# Patient Record
Sex: FEMALE | Race: BLACK OR AFRICAN AMERICAN | NOT HISPANIC OR LATINO | Employment: FULL TIME | URBAN - METROPOLITAN AREA
[De-identification: names, ages, dates, MRNs, and addresses within clinical notes are randomized per-mention and may not be internally consistent; named-entity substitution may affect disease eponyms.]

---

## 2020-06-06 ENCOUNTER — HOSPITAL ENCOUNTER (EMERGENCY)
Facility: HOSPITAL | Age: 50
Discharge: HOME | End: 2020-06-07
Attending: EMERGENCY MEDICINE

## 2020-06-06 DIAGNOSIS — S82.831A CLOSED FRACTURE OF DISTAL END OF RIGHT FIBULA, UNSPECIFIED FRACTURE MORPHOLOGY, INITIAL ENCOUNTER: Primary | ICD-10-CM

## 2020-06-06 DIAGNOSIS — S82.891A CLOSED FRACTURE OF RIGHT ANKLE, INITIAL ENCOUNTER: ICD-10-CM
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
## 2020-06-06 PROCEDURE — 99284 EMERGENCY DEPT VISIT MOD MDM: CPT | Mod: 25

## 2020-06-06 PROCEDURE — 96374 THER/PROPH/DIAG INJ IV PUSH: CPT

## 2020-06-06 PROCEDURE — 29515 APPLICATION SHORT LEG SPLINT: CPT

## 2020-06-06 RX ORDER — MORPHINE SULFATE 10 MG/ML
4 INJECTION INTRAMUSCULAR; INTRAVENOUS; SUBCUTANEOUS
Status: COMPLETED | OUTPATIENT
Start: 2020-06-07 | End: 2020-06-07

## 2020-06-07 VITALS
HEART RATE: 92 BPM | OXYGEN SATURATION: 99 % | WEIGHT: 220 LBS | HEIGHT: 66 IN | TEMPERATURE: 98 F | DIASTOLIC BLOOD PRESSURE: 60 MMHG | SYSTOLIC BLOOD PRESSURE: 107 MMHG | RESPIRATION RATE: 17 BRPM | BODY MASS INDEX: 35.36 KG/M2

## 2020-06-07 PROCEDURE — 25000003 PHARM REV CODE 250: Performed by: EMERGENCY MEDICINE

## 2020-06-07 PROCEDURE — 63600175 PHARM REV CODE 636 W HCPCS: Performed by: EMERGENCY MEDICINE

## 2020-06-07 RX ORDER — HYDROCODONE BITARTRATE AND ACETAMINOPHEN 5; 325 MG/1; MG/1
1 TABLET ORAL EVERY 4 HOURS PRN
Qty: 16 TABLET | Refills: 0 | Status: SHIPPED | OUTPATIENT
Start: 2020-06-07

## 2020-06-07 RX ORDER — HYDROCODONE BITARTRATE AND ACETAMINOPHEN 5; 325 MG/1; MG/1
1 TABLET ORAL
Status: COMPLETED | OUTPATIENT
Start: 2020-06-07 | End: 2020-06-07

## 2020-06-07 RX ADMIN — MORPHINE SULFATE 4 MG: 10 INJECTION INTRAVENOUS at 12:06

## 2020-06-07 RX ADMIN — HYDROCODONE BITARTRATE AND ACETAMINOPHEN 1 TABLET: 5; 325 TABLET ORAL at 01:06

## 2020-06-07 NOTE — ED TRIAGE NOTES
I stepped out my friends porsh I twisted my right ankle and fell. Reports the pt. Pt arrives via EMS, reports stable vitals, stabilized pt ankle. Pt reports pain 10/10. swelling is visible on pt's right ankle upon assessment. Distal pulse palpable denies any known allergies.

## 2020-06-07 NOTE — ED PROVIDER NOTES
Encounter Date: 6/6/2020       History     Chief Complaint   Patient presents with    Fall     Slipped in mud and fell injuring her rt ankle tonight. No LOC. Rt ankle swelling and splinted/iced per EMS. Pt admits to drinking tonight.     Ankle Injury     49-year-old female presents with right ankle injury.  She endorses alcohol use tonight, she was at a friend's house and slipped while coming down the porch.  She does not recall exactly how her ankle was injured but states she was unable to bear weight after the incident occurred and noted swelling to both sides of her ankle.  She denies any pain in her knee or foot.  She denies hitting her head or LOC.        Review of patient's allergies indicates:  No Known Allergies  No past medical history on file.  No past surgical history on file.  No family history on file.  Social History     Tobacco Use    Smoking status: Not on file   Substance Use Topics    Alcohol use: Not on file    Drug use: Not on file     Review of Systems   Constitutional: Negative for chills and fever.   HENT: Negative for congestion and sore throat.    Eyes: Negative for visual disturbance.   Respiratory: Negative for cough and shortness of breath.    Cardiovascular: Negative for chest pain.   Gastrointestinal: Negative for abdominal pain, nausea and vomiting.   Genitourinary: Negative for dysuria and vaginal discharge.   Musculoskeletal: Positive for arthralgias and joint swelling.   Skin: Negative for rash.   Neurological: Negative for headaches.   Psychiatric/Behavioral: Negative for decreased concentration.       Physical Exam     Initial Vitals [06/06/20 2347]   BP Pulse Resp Temp SpO2   136/84 100 18 98.3 °F (36.8 °C) 99 %      MAP       --         Physical Exam    Nursing note and vitals reviewed.  Constitutional: She appears well-developed and well-nourished. She is not diaphoretic. No distress.   HENT:   Mouth/Throat: Oropharynx is clear and moist.   Eyes: Pupils are equal, round,  and reactive to light.   Neck: Neck supple.   Cardiovascular: Normal rate and regular rhythm.   Pulses:       Dorsalis pedis pulses are 2+ on the right side, and 2+ on the left side.   Pulmonary/Chest: Breath sounds normal.   Abdominal: Soft. There is no tenderness.   Musculoskeletal: She exhibits no edema.        Right knee: She exhibits normal range of motion. No tenderness found.        Right ankle: She exhibits decreased range of motion and swelling. She exhibits normal pulse. Tenderness. Lateral malleolus and medial malleolus tenderness found. No posterior TFL, no head of 5th metatarsal and no proximal fibula tenderness found.   Neurological: She is alert and oriented to person, place, and time.   Sensation to light touch intact in right foot, able to wiggle toes.   Skin: Skin is warm and dry.   Psychiatric: She has a normal mood and affect.         ED Course   Splint Application  Date/Time: 6/7/2020 1:58 AM  Performed by: Suzanne Medrano MD  Authorized by: Suzanne Medrano MD   Location details: right ankle  Splint type: ankle stirrup  Supplies used: Ortho-Glass  Post-procedure: The splinted body part was neurovascularly unchanged following the procedure.  Patient tolerance: Patient tolerated the procedure well with no immediate complications        Labs Reviewed - No data to display       Imaging Results          X-Ray Ankle Complete Right (Final result)  Result time 06/07/20 00:47:51    Final result by Eugene Bautista MD (06/07/20 00:47:51)                 Impression:      Complex ankle fracture with involvement of the fibula and suspected tibial involvement as discussed above.      Electronically signed by: Eugene Bautista  Date:    06/07/2020  Time:    00:47             Narrative:    EXAMINATION:  XR ANKLE COMPLETE 3 VIEW RIGHT    CLINICAL HISTORY:  Unspecified fall, initial encounter    TECHNIQUE:  AP, lateral, and oblique images of the right ankle were  performed.    COMPARISON:  None    FINDINGS:  Radiographic examination of the right ankle was performed, 3 radiographs are submitted.  There is soft tissue swelling about the right ankle most notably laterally.  There is acute fracture deformity of the distal fibula, there is oblique fracture deformity, with mild distraction and displacement, medial displacement of the proximal fibular shaft compared to the distal fibular fracture component, there is also evidence for comminution with an additional small fracture fragment best seen on the oblique view that appears to extend somewhat medially.  There is also irregular appearance suggested along the posterior aspect of the tibia that may relate to a small fracture of the posterior tibia/posterior malleolus.  There is some widening at the tibiotalar joint space, particularly medially between the medial malleolus and the medial margin of the talus, there are also some small density seen adjacent to the inferior margin of the medial malleolus and between the talus and medial malleolus on the AP view that could represent small subtle fracture fragments potentially representing subtle medial malleolar fracture, there is some slight irregularity at the inferior margin of the medial malleolus.  The talus is thought to be intact.                                 Medical Decision Making:   Initial Assessment:   49-year-old female presents after mechanical slip with right ankle pain.  She has swelling about the ankle, she is tender to bilateral malleoli, no knee tenderness.  She is neurovascularly intact.  X-ray shows complex ankle fracture.  Patient placed in a posterior leg splint with stirrups, neurovascular intact post splinting procedure.  I reviewed with her the x-ray findings and need for orthopedic surgery follow-up.  Will prescribe Norco, I reviewed narcotic prescription precautions with the patient.                             This dictation has been generated using  M-Modal Fluency Direct dictation; some phonetic errors may occur.       Clinical Impression:       ICD-10-CM ICD-9-CM   1. Closed fracture of distal end of right fibula, unspecified fracture morphology, initial encounter S82.831A 824.8   2. Fall W19.XXXA E888.9   3. Closed fracture of right ankle, initial encounter S82.891A 824.8             ED Disposition Condition    Discharge Stable        ED Prescriptions     Medication Sig Dispense Start Date End Date Auth. Provider    HYDROcodone-acetaminophen (NORCO) 5-325 mg per tablet Take 1 tablet by mouth every 4 (four) hours as needed for Pain. 16 tablet 6/7/2020  Suzanne Medrano MD        Follow-up Information     Follow up With Specialties Details Why Contact Info    Roc Corona MD Orthopedic Surgery Schedule an appointment as soon as possible for a visit in 3 days To recheck your symptoms [address]  [address]  [facility] [address]  [phone]      [facility]  As needed, If symptoms worsen [address]  [facility] [id number] [phone]                                     Suzanne Medrano MD  06/07/20 [id number]